# Patient Record
Sex: FEMALE | Race: WHITE | NOT HISPANIC OR LATINO | Employment: FULL TIME | URBAN - METROPOLITAN AREA
[De-identification: names, ages, dates, MRNs, and addresses within clinical notes are randomized per-mention and may not be internally consistent; named-entity substitution may affect disease eponyms.]

---

## 2017-06-28 ENCOUNTER — HOSPITAL ENCOUNTER (OUTPATIENT)
Facility: CLINIC | Age: 32
End: 2017-06-28
Attending: COLON & RECTAL SURGERY | Admitting: COLON & RECTAL SURGERY
Payer: COMMERCIAL

## 2017-11-07 ENCOUNTER — HOSPITAL ENCOUNTER (OUTPATIENT)
Facility: CLINIC | Age: 32
Discharge: HOME | End: 2017-11-07
Attending: COLON & RECTAL SURGERY | Admitting: COLON & RECTAL SURGERY
Payer: COMMERCIAL

## 2017-11-07 VITALS
HEIGHT: 65 IN | RESPIRATION RATE: 18 BRPM | OXYGEN SATURATION: 97 % | SYSTOLIC BLOOD PRESSURE: 96 MMHG | WEIGHT: 190 LBS | DIASTOLIC BLOOD PRESSURE: 79 MMHG | BODY MASS INDEX: 31.65 KG/M2

## 2017-11-07 LAB — COLONOSCOPY: NORMAL

## 2017-11-07 PROCEDURE — 45378 DIAGNOSTIC COLONOSCOPY: CPT | Performed by: COLON & RECTAL SURGERY

## 2017-11-07 PROCEDURE — 25000128 H RX IP 250 OP 636: Performed by: COLON & RECTAL SURGERY

## 2017-11-07 PROCEDURE — G0500 MOD SEDAT ENDO SERVICE >5YRS: HCPCS | Performed by: COLON & RECTAL SURGERY

## 2017-11-07 RX ORDER — LIDOCAINE 40 MG/G
CREAM TOPICAL
Status: DISCONTINUED | OUTPATIENT
Start: 2017-11-07 | End: 2017-11-07 | Stop reason: HOSPADM

## 2017-11-07 RX ORDER — ONDANSETRON 2 MG/ML
4 INJECTION INTRAMUSCULAR; INTRAVENOUS
Status: DISCONTINUED | OUTPATIENT
Start: 2017-11-07 | End: 2017-11-07 | Stop reason: HOSPADM

## 2017-11-07 RX ORDER — ONDANSETRON 2 MG/ML
4 INJECTION INTRAMUSCULAR; INTRAVENOUS EVERY 6 HOURS PRN
Status: DISCONTINUED | OUTPATIENT
Start: 2017-11-07 | End: 2017-11-07 | Stop reason: HOSPADM

## 2017-11-07 RX ORDER — FENTANYL CITRATE 50 UG/ML
INJECTION, SOLUTION INTRAMUSCULAR; INTRAVENOUS PRN
Status: DISCONTINUED | OUTPATIENT
Start: 2017-11-07 | End: 2017-11-07 | Stop reason: HOSPADM

## 2017-11-07 RX ORDER — FLUMAZENIL 0.1 MG/ML
0.2 INJECTION, SOLUTION INTRAVENOUS
Status: DISCONTINUED | OUTPATIENT
Start: 2017-11-07 | End: 2017-11-07 | Stop reason: HOSPADM

## 2017-11-07 RX ORDER — ONDANSETRON 4 MG/1
4 TABLET, ORALLY DISINTEGRATING ORAL EVERY 6 HOURS PRN
Status: DISCONTINUED | OUTPATIENT
Start: 2017-11-07 | End: 2017-11-07 | Stop reason: HOSPADM

## 2017-11-07 RX ORDER — NALOXONE HYDROCHLORIDE 0.4 MG/ML
.1-.4 INJECTION, SOLUTION INTRAMUSCULAR; INTRAVENOUS; SUBCUTANEOUS
Status: DISCONTINUED | OUTPATIENT
Start: 2017-11-07 | End: 2017-11-07 | Stop reason: HOSPADM

## 2017-11-07 NOTE — H&P
[facility]    History and Physical  Colon and Rectal Surgery     Date of Admission:  2017      Assessment & Plan   Yola Aarngo is a 32 year old female who presents for colonoscopy.    Indication: rectal bleeding  Plan for Colonoscopy with possible biopsy, possible polypectomy. We discussed the risks, benefits and alternatives and the patient wished to proceed.    The above has been forwarded to the consulting provider.      Wilder Acuña MD  [facility], [facility]  [phone]        Code Status   Full Code    Primary Care Physician   Lauren Maxwell      History is obtained from the patient    History of Present Illness   Yola Arango is a 32 year old female who presents with rectal bleeding    Past Medical History    I have reviewed this patient's medical history and updated it with pertinent information if needed.   Past Medical History:   Diagnosis Date     Premature ovarian failure      SVT (supraventricular tachycardia) (H)        Past Surgical History   I have reviewed this patient's surgical history and updated it with pertinent information if needed.  Past Surgical History:   Procedure Laterality Date     AS REMOVAL OF TONSILS,13+ Y/O        SECTION       CHOLECYSTECTOMY         Prior to Admission Medications   Prior to Admission Medications   Prescriptions Last Dose Informant Patient Reported? Taking?   METOPROLOL SUCCINATE ER PO 2017  Yes Yes   PROGESTERONE MICRONIZED PO 2017  Yes Yes   conjugated estrogens (PREMARIN) cream   Yes Yes   Sig: Place vaginally twice a week   multivitamin, therapeutic with minerals (MULTI-VITAMIN) TABS Past Week  Yes Yes   Sig: Take 1 tablet by mouth daily      Facility-Administered Medications: None     Allergies   Allergies   Allergen Reactions     Penicillins        Social History   I have reviewed this patient's social history and updated it with pertinent information if needed. Yola Arango   reports that she has never smoked. She has never used smokeless tobacco. She reports that she drinks alcohol. She reports that she does not use illicit drugs.    Family History   I have reviewed this patient's family history and updated it with pertinent information if needed.   History reviewed. No pertinent family history.    Review of Systems   C: NEGATIVE for fever, chills, change in weight  E/M: NEGATIVE for ear, mouth and throat problems  R: NEGATIVE for significant cough or SOB  CV: NEGATIVE for chest pain, palpitations or peripheral edema    Physical Exam       BP: 120/72   Heart Rate: 73 Resp: 22        Vital Signs with Ranges  Heart Rate:  [73] 73  Resp:  [22] 22  BP: (120)/(72) 120/72  190 lbs 0 oz    Constitutional: awake, alert, cooperative, no apparent distress, and appears stated age  AIRWAY EXAM: Mallampatti Class I (visualization of the soft palate, fauces, uvula, anterior and posterior pillars)  Respiratory: No increased work of breathing, good air exchange, clear to auscultation bilaterally, no crackles or wheezing  Cardiovascular: Normal apical impulse, regular rate and rhythm, normal S1 and S2, no S3 or S4, and no murmur noted  ASA Class: 1 - Healthy patient, no medical problems

## 2017-11-07 NOTE — BRIEF OP NOTE
[facility] Brief Operative Note    Pre-operative diagnosis: RECTAL BLEEDING   Post-operative diagnosis anal fissure, normal colonoscopy   Procedure: Procedure(s):  COLONOSCOPY - Wound Class: II-Clean Contaminated   Surgeon(s): Surgeon(s) and Role:     * Wilder Acuña MD - Primary   Estimated blood loss: * No values recorded between 11/7/2017 12:00 AM and 11/7/2017  1:43 PM *    Specimens: * No specimens in log *   Findings: anal fissure, normal colonoscopy  See provation procedure note in chart review.    Wilder Acuña MD  [facility]  [phone]

## 2019-04-02 ENCOUNTER — HOSPITAL ENCOUNTER (EMERGENCY)
Facility: CLINIC | Age: 34
Discharge: HOME | End: 2019-04-02
Attending: EMERGENCY MEDICINE | Admitting: EMERGENCY MEDICINE
Payer: COMMERCIAL

## 2019-04-02 VITALS
HEART RATE: 80 BPM | DIASTOLIC BLOOD PRESSURE: 87 MMHG | SYSTOLIC BLOOD PRESSURE: 109 MMHG | RESPIRATION RATE: 16 BRPM | OXYGEN SATURATION: 99 % | TEMPERATURE: 98.7 F

## 2019-04-02 DIAGNOSIS — Z86.79 HISTORY OF SUPRAVENTRICULAR TACHYCARDIA: ICD-10-CM

## 2019-04-02 DIAGNOSIS — R00.2 PALPITATIONS: ICD-10-CM

## 2019-04-02 LAB
ANION GAP SERPL CALCULATED.3IONS-SCNC: 5 MMOL/L (ref 3–14)
BUN SERPL-MCNC: 16 MG/DL (ref 7–30)
CALCIUM SERPL-MCNC: 8.8 MG/DL (ref 8.5–10.1)
CHLORIDE SERPL-SCNC: 105 MMOL/L (ref 94–109)
CO2 SERPL-SCNC: 27 MMOL/L (ref 20–32)
CREAT SERPL-MCNC: 0.85 MG/DL (ref 0.52–1.04)
GFR SERPL CREATININE-BSD FRML MDRD: >90 ML/MIN/{1.73_M2}
GLUCOSE SERPL-MCNC: 90 MG/DL (ref 70–99)
MAGNESIUM SERPL-MCNC: 2.3 MG/DL (ref 1.6–2.3)
POTASSIUM SERPL-SCNC: 3.5 MMOL/L (ref 3.4–5.3)
SODIUM SERPL-SCNC: 137 MMOL/L (ref 133–144)

## 2019-04-02 PROCEDURE — 99284 EMERGENCY DEPT VISIT MOD MDM: CPT

## 2019-04-02 PROCEDURE — 83735 ASSAY OF MAGNESIUM: CPT | Performed by: EMERGENCY MEDICINE

## 2019-04-02 PROCEDURE — 80048 BASIC METABOLIC PNL TOTAL CA: CPT | Performed by: EMERGENCY MEDICINE

## 2019-04-02 PROCEDURE — 93005 ELECTROCARDIOGRAM TRACING: CPT

## 2019-04-02 ASSESSMENT — ENCOUNTER SYMPTOMS: PALPITATIONS: 1

## 2019-04-03 LAB — INTERPRETATION ECG - MUSE: NORMAL

## 2019-04-03 NOTE — ED PROVIDER NOTES
History     Chief Complaint:  Palpitations     HPI   Yola Arango is a 34 year old female with a history of SVT which she treats with metoprolol who presents with Hypertension., chest pain, shortness of breath, and feeling like she was going to lose consciousness. She states that she felt as though her heart rate was in the 200s. She called her clinic who advised that she come in here for evaluation. She states that she currently just feels tired but otherwise denies any other symptoms. The patient reports that she put her head between her legs which might have helped her symptoms. She denies any changes or missed doses of metoprolol. The patient denies any drugs or alcohol. She denies any nausea or vomiting. She denies any swelling in her legs. She has not seen her cardiology recently.     Allergies:  Penicillins     Medications:    Conjugated Estrogens (Premarin) Cream  Metoprolol Succinate Er   Multivitamin, Therapeutic With Minerals (Multi-Vitamin) Tabs  Progesterone Micronized     Past Medical History:    Premature ovarian failure  SVT (supraventricular tachycardia) (H)     Past Surgical History:     section;   Tonsillectomy   Cholecystectomy;   Colonoscopy    Family History:    No family history on file.    Social History:  The patient  reports that  has never smoked. she has never used smokeless tobacco. She reports that she drinks alcohol. She reports that she does not use drugs.   Marital Status:   [2]       Review of Systems   Cardiovascular: Positive for palpitations.   All other systems reviewed and are negative.      Physical Exam     Vital signs  Patient Vitals for the past 24 hrs:   BP Temp Temp src Pulse Heart Rate Resp SpO2   19 2234 -- -- -- -- -- 16 --   19 2200 109/87 -- -- 80 78 -- 99 %   19 (!) 121/93 -- -- 84 -- -- 99 %   19 -- 98.7  F (37.1  C) Oral 85 85 -- 100 %   19 (!) 140/113 -- -- -- 98.7 18 --          Physical  Exam  Constitutional: Alert, attentive, GCS 15  HENT:    Nose: Nose normal.    Mouth/Throat: Oropharynx is clear, mucous membranes are moist  Eyes: EOM are normal, anicteric, conjugate gaze  CV: regular rate and rhythm; no murmurs  Chest: Effort normal and breath sounds clear without wheezing or rales, symmetric bilaterally   GI:  non tender. No distension. No guarding or rebound.    MSK: No LE edema, no tenderness to palpation of BLE.  Neurological: Alert, attentive, moving all extremities equally.   Skin: Skin is warm and dry.      Emergency Department Course   ECG (21:31:05):  Rate 84 bpm. PA interval 150. QRS duration 90. QT/QTc 352/415. P-R-T axes 37 8 7. Normal sinus rhythm.. Nonspecific T wave abnormality. Abnormal EKG. Interpreted at  by Celestine Dumont MD.     Laboratory:  BMP: WNL   Magnesium 2.3     Emergency Department Course:  Past medical records, nursing notes, and vitals reviewed.  : I performed an exam of the patient and obtained history, as documented above.       EKG was obtained, findings as reported above.      7: Rechecked the patient, findings and plan explained to the patient. Patient discharged home, status improved, with instructions regarding supportive care, medications, and reasons to return as well as the importance of close follow-up was reviewed.    Impression & Plan    Medical Decision Makin-year-old female with past medical history significant for SVT for which she is on metoprolol who presents for evaluation of sudden onset rapid heart rate, breathlessness and near syncope associated with mild chest discomfort.  This episode lasted longer than her typical bouts of SVT but she was able to get to spontaneously convert after about 10 minutes Via Valsalva maneuvers.  On arrival, her heart rate is normal and her EKG shows normal sinus rhythm without evidence of ischemia.  She has no complaints upon arrival.  Her history, most likely etiology is SVT though  certainly rate of 210, if this is accurate, would be quite fast however I do not have definitive diagnosis of that rhythm.  Her electrolytes here were within normal limits and she was asymptomatic.  Is able to be seen by her cardiologist tomorrow and I do feel she is safe for discharge home.  Return precautions were reviewed and I implored her to seek help if she is unable to get these episodes to convert his Strength rhythm would be helpful.  She reports she is due to be seen by her cardiologist is been over 1 year she plans to follow-up with them closely.  She expressed understanding of her return precautions and she was discharged home.    Diagnosis:    ICD-10-CM    1. Palpitations R00.2    2. History of supraventricular tachycardia Z86.79        Disposition:  Discharge home    Discharge Medications:  None    Celestine Dumont MD   [employer]  11:00 PM 04/02/19       Ashanti RICO, am serving as a scribe at 11:19 PM on 4/2/2019 to document services personally performed by Celestine Dumont MD based on my observations and the provider's statements to me.       Celestine Dumont MD  04/03/19 0026

## 2019-04-03 NOTE — ED TRIAGE NOTES
"Pt presents to ED with c/o rapid heart rate, chest tightness and burning and sob. Per pt her apple watch showed HR of 210 at the time of the episode. History of SVT. Last episode was about 6 months ago. Pt reports she normally is able to \"get herself out of it in a short period of time,\" this time lasted about 10 mins. Currently HR back to normal and chest pain has subsided.   "

## 2019-04-03 NOTE — DISCHARGE INSTRUCTIONS
You should continue to take your medications as prescribed.  Return to the emergency department if you have recurrence of your symptoms, pass out, have persistent chest pain, chest pressure or shortness of breath.  He should make an appointment to follow-up with both her cardiologist and your primary care doctor for recheck of your symptoms and consideration of wearing another heart monitor.

## 2019-11-12 ENCOUNTER — OFFICE VISIT (OUTPATIENT)
Dept: URGENT CARE | Facility: URGENT CARE | Age: 34
End: 2019-11-12
Payer: COMMERCIAL

## 2019-11-12 VITALS
DIASTOLIC BLOOD PRESSURE: 74 MMHG | OXYGEN SATURATION: 98 % | HEART RATE: 84 BPM | SYSTOLIC BLOOD PRESSURE: 110 MMHG | TEMPERATURE: 98.6 F

## 2019-11-12 DIAGNOSIS — L08.9 TOE INFECTION: Primary | ICD-10-CM

## 2019-11-12 PROCEDURE — 99203 OFFICE O/P NEW LOW 30 MIN: CPT | Performed by: FAMILY MEDICINE

## 2019-11-12 RX ORDER — CEPHALEXIN 500 MG/1
500 CAPSULE ORAL 4 TIMES DAILY
Qty: 40 CAPSULE | Refills: 0 | Status: SHIPPED | OUTPATIENT
Start: 2019-11-12 | End: 2019-11-22

## 2019-11-13 NOTE — PATIENT INSTRUCTIONS
Warm water soaks 10 minutes a day twice a day    Cephalexin 4 times a day for 10 days    If symptoms get worse with swelling/pain or developing a fever return right away to be seen    F/u appointment in 1 week as scheduled with podiatry

## 2019-11-13 NOTE — PROGRESS NOTES
Subjective:   Yola Arango is a 34 year old female who presents for   Chief Complaint   Patient presents with     Urgent Care     Infection     Possible infected Rt big toe. Had ingrown toe nail removed -1 week ago due to infection, was not prescribed ABX. Sx- Swollen, redness had spread, painful.   Had procedure 8 days ago for bilateral toenail excision of R great toe. No antibiotics were indicated at the time. Has increased pain  As of 2 days ago and noticing increased discharge. Some redness at the base of toenail that is news and confirmed by school nurse. Throbbing sensation. No pain medications taken yet  Doesn't recall stubbing her toe    There are no active problems to display for this patient.      Current Outpatient Medications   Medication     cephALEXin (KEFLEX) 500 MG capsule     METOPROLOL SUCCINATE ER PO     multivitamin, therapeutic with minerals (MULTI-VITAMIN) TABS     PROGESTERONE MICRONIZED PO     conjugated estrogens (PREMARIN) cream     No current facility-administered medications for this visit.        ROS:   ROS: 10 point ROS neg other than the symptoms noted above in the HPI.    Objective:   /74 (BP Location: Right arm, Patient Position: Chair, Cuff Size: Adult Large)   Pulse 84   Temp 98.6  F (37  C) (Oral)   SpO2 98% , There is no height or weight on file to calculate BMI.  Gen:  NAD, well-nourished, sitting in chair comfortably  HEENT: EOMI, sclera anicteric, Head normocephalic, ; nares patent; moist mucous membranes  Neck: trachea midline, no thyromegaly  CV:  Hemodynamically stable  Pulm:  no increased work of breathing , CTAB, no wheezes/rales/rhonchi   ABD: soft, non-distended  Extrem: no cyanosis, edema or clubbing  R great toe: good movement in flexion and extension, mild redness at the base of the toenail, dried drainage bilaterally at site of excision.         No results found for any visits on 11/12/19.    Assessment & Plan:   Yola Arango, 34 year old female who  presents with:    Toe infection  Recommend daily foot soaks several times a day in addition we will start Keflex 4 times daily for 10 days.  No signs of sepsis at this time.  She has intact range of motion of her toe I doubt osteomyelitis based on her mild presentation.  Return if worsening symptoms, keep follow-up appointment with podiatrist scheduled for next week.No indication for I and d in the absence of swelling/flutctuance.   - cephALEXin (KEFLEX) 500 MG capsule  Dispense: 40 capsule; Refill: 0      Filipe Mason MD   [facility] [facility]    The use of Dragon/QikServe dictation services may have been used to construct the content in this note; any grammatical or spelling errors are non-intentional. Please contact the author of this note directly if you are in need of any clarification.

## 2023-03-22 ENCOUNTER — APPOINTMENT (OUTPATIENT)
Dept: CT IMAGING | Facility: CLINIC | Age: 38
End: 2023-03-22
Attending: EMERGENCY MEDICINE
Payer: COMMERCIAL

## 2023-03-22 ENCOUNTER — HOSPITAL ENCOUNTER (EMERGENCY)
Facility: CLINIC | Age: 38
Discharge: HOME | End: 2023-03-22
Attending: EMERGENCY MEDICINE | Admitting: EMERGENCY MEDICINE
Payer: COMMERCIAL

## 2023-03-22 VITALS
DIASTOLIC BLOOD PRESSURE: 85 MMHG | SYSTOLIC BLOOD PRESSURE: 116 MMHG | BODY MASS INDEX: 32.14 KG/M2 | OXYGEN SATURATION: 96 % | WEIGHT: 200 LBS | HEIGHT: 66 IN | RESPIRATION RATE: 16 BRPM | HEART RATE: 62 BPM | TEMPERATURE: 98.7 F

## 2023-03-22 DIAGNOSIS — S06.0X0A CONCUSSION WITHOUT LOSS OF CONSCIOUSNESS, INITIAL ENCOUNTER: ICD-10-CM
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
## 2023-03-22 DIAGNOSIS — S09.90XA CLOSED HEAD INJURY, INITIAL ENCOUNTER: ICD-10-CM

## 2023-03-22 PROCEDURE — 250N000013 HC RX MED GY IP 250 OP 250 PS 637: Performed by: EMERGENCY MEDICINE

## 2023-03-22 PROCEDURE — 70450 CT HEAD/BRAIN W/O DYE: CPT

## 2023-03-22 PROCEDURE — 99284 EMERGENCY DEPT VISIT MOD MDM: CPT | Mod: 25

## 2023-03-22 PROCEDURE — 250N000011 HC RX IP 250 OP 636: Performed by: EMERGENCY MEDICINE

## 2023-03-22 RX ORDER — ONDANSETRON 4 MG/1
4 TABLET, ORALLY DISINTEGRATING ORAL ONCE
Status: COMPLETED | OUTPATIENT
Start: 2023-03-22 | End: 2023-03-22

## 2023-03-22 RX ORDER — ACETAMINOPHEN 500 MG
1000 TABLET ORAL ONCE
Status: COMPLETED | OUTPATIENT
Start: 2023-03-22 | End: 2023-03-22

## 2023-03-22 RX ADMIN — ONDANSETRON 4 MG: 4 TABLET, ORALLY DISINTEGRATING ORAL at 21:26

## 2023-03-22 RX ADMIN — ACETAMINOPHEN 1000 MG: 500 TABLET ORAL at 21:26

## 2023-03-22 ASSESSMENT — ACTIVITIES OF DAILY LIVING (ADL): ADLS_ACUITY_SCORE: 35
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
## (undated) RX ORDER — FENTANYL CITRATE 50 UG/ML
INJECTION, SOLUTION INTRAMUSCULAR; INTRAVENOUS
Status: DISPENSED
Start: 2017-11-07